# Patient Record
Sex: FEMALE
[De-identification: names, ages, dates, MRNs, and addresses within clinical notes are randomized per-mention and may not be internally consistent; named-entity substitution may affect disease eponyms.]

---

## 2020-04-20 PROBLEM — Z00.00 ENCOUNTER FOR PREVENTIVE HEALTH EXAMINATION: Status: ACTIVE | Noted: 2020-04-20

## 2020-04-21 ENCOUNTER — APPOINTMENT (OUTPATIENT)
Dept: OTOLARYNGOLOGY | Facility: CLINIC | Age: 25
End: 2020-04-21
Payer: COMMERCIAL

## 2020-04-21 PROCEDURE — 99202 OFFICE O/P NEW SF 15 MIN: CPT | Mod: 95

## 2020-04-21 RX ORDER — NEOMYCIN SULFATE, POLYMYXIN B SULFATE, HYDROCORTISONE 3.5; 10000; 1 MG/ML; [USP'U]/ML; MG/ML
1 SOLUTION/ DROPS AURICULAR (OTIC)
Qty: 10 | Refills: 0 | Status: ACTIVE | COMMUNITY
Start: 2020-03-19

## 2020-04-21 RX ORDER — AMOXICILLIN 875 MG/1
875 TABLET, FILM COATED ORAL
Qty: 20 | Refills: 0 | Status: ACTIVE | COMMUNITY
Start: 2020-03-10

## 2020-04-21 RX ORDER — FLUTICASONE PROPIONATE 50 UG/1
50 SPRAY, METERED NASAL
Qty: 16 | Refills: 0 | Status: ACTIVE | COMMUNITY
Start: 2020-03-10

## 2020-04-21 RX ORDER — CLINDAMYCIN PHOSPHATE 1 G/10ML
1 GEL TOPICAL
Qty: 30 | Refills: 0 | Status: ACTIVE | COMMUNITY
Start: 2020-03-11

## 2020-04-21 RX ORDER — PREDNISONE 20 MG/1
20 TABLET ORAL
Qty: 5 | Refills: 0 | Status: ACTIVE | COMMUNITY
Start: 2020-03-10

## 2020-04-21 RX ORDER — AZITHROMYCIN 250 MG/1
250 TABLET, FILM COATED ORAL
Qty: 6 | Refills: 0 | Status: ACTIVE | COMMUNITY
Start: 2020-03-19

## 2020-04-21 NOTE — HISTORY OF PRESENT ILLNESS
[Home] : at home, [unfilled] , at the time of the visit. [Medical Office: ([facility])___] : at the medical office located in  [Patient] : the patient [Self] : self [de-identified] : 23 yo female \par 3/10/2020 developed nasal congestion and bilat ear pain-moderate\par ER eval-ear infection and rx'd amoxil and ear dops\par Pt still has pain and requires Alleve around the clock for relief\par Pt suspects that the drops may be helpful\par Developed left tinnitus that resolved\par Denies clenching teeth and q tips\par no other modifying factors\par no nasal or throat complaints at this time\par  [No] : patient does not have a  history of radiation therapy [Tinnitus] : tinnitus [Dizziness] : no dizziness [Hearing Loss] : no hearing loss [Headache] : no headache [Otalgia] : otalgia [Otorrhea] : no otorrhea [Vertigo] : no vertigo [Otitis Media with Effusion] : otitis media with effusion [Early Onset Hearing Loss] : no early onset hearing loss [Smoking] : no smoking [Loud Noise Exposure] : no history of loud noise exposure [Stroke] : no stroke [Facial Pain] : no facial pain [Facial Pressure] : no facial pressure [Nasal Congestion] : no nasal congestion [Ear Fullness] : no ear fullness [Allergic Rhinitis] : no allergic rhinitis [Environmental Allergies] : no environmental allergies [Seasonal Allergies] : no seasonal allergies [Environmental Allergens] : no environmental allergens [Adenoidectomy] : no adenoidectomy [Asthma] : no asthma [Allergies] : no allergies [Neck Mass] : no neck mass [Chills] : no chills [Neck Pain] : no neck pain [Cold Intolerance] : no cold intolerance [Cough] : no cough [Fatigue] : no fatigue [Heat Intolerance] : no heat intolerance [Hyperthyroidism] : no hyperthyroidism [Hodgkin Disease] : no hodgkin disease [Sialadenitis] : no sialadenitis [Tobacco Use] : no tobacco use [Non-Hodgkin Lymphoma] : no non-hodgkin lymphoma [Graves Disease] : no graves disease [Alcohol Use] : no alcohol use [Thyroid Cancer] : no thyroid cancer

## 2020-04-23 ENCOUNTER — APPOINTMENT (OUTPATIENT)
Dept: OTOLARYNGOLOGY | Facility: CLINIC | Age: 25
End: 2020-04-23
Payer: COMMERCIAL

## 2020-04-23 VITALS
SYSTOLIC BLOOD PRESSURE: 118 MMHG | HEART RATE: 92 BPM | DIASTOLIC BLOOD PRESSURE: 84 MMHG | WEIGHT: 241 LBS | BODY MASS INDEX: 42.7 KG/M2 | HEIGHT: 63 IN

## 2020-04-23 DIAGNOSIS — H92.03 OTALGIA, BILATERAL: ICD-10-CM

## 2020-04-23 DIAGNOSIS — M26.609 UNSPECIFIED TEMPOROMANDIBULAR JOINT DISORDER: ICD-10-CM

## 2020-04-23 DIAGNOSIS — R09.81 NASAL CONGESTION: ICD-10-CM

## 2020-04-23 DIAGNOSIS — Z80.9 FAMILY HISTORY OF MALIGNANT NEOPLASM, UNSPECIFIED: ICD-10-CM

## 2020-04-23 DIAGNOSIS — H93.12 TINNITUS, LEFT EAR: ICD-10-CM

## 2020-04-23 PROCEDURE — 99214 OFFICE O/P EST MOD 30 MIN: CPT

## 2020-04-23 NOTE — ASSESSMENT
[FreeTextEntry1] : TMJ:\par -soft food diet \par -dental evaluation \par -Tylenol for pain \par -warm and cold compresses \par -No gum chewing \par \par f/u prn

## 2020-04-23 NOTE — HISTORY OF PRESENT ILLNESS
[de-identified] : 23 yo female\par Patient present s/p telehealth visit. Patient complains of moderate bilateral ear pain Left more than right which started March 10, 2020. Was put on Amoxicillin and neomycin with no relief. Today she still continues to have ear pain accompanied with headaches. Has hx of migraine. Takes Aleve around the clock which helps with the earache. \par Denies seasonal allergies.\par Pt has no ear drainage, hearing loss, tinnitus, vertigo, nasal congestion, nasal discharge, epistaxis, sinus infections, facial pain, facial pressure, throat pain, dysphagia or fevers\par \par  [Tinnitus] : no tinnitus [Anxiety] : no anxiety [Dizziness] : no dizziness [Headache] : no headache [Hearing Loss] : no hearing loss [Recurrent Otitis Media] : no recurrent otitis media [Otitis Media with Effusion] : no otitis media with effusion [Presbycusis] : no presbycusis [Congenital Ear Malformation] : no congenital ear malformation [Meniere Disease] : no Meniere disease [Otosclerosis] : no otosclerosis [Perilymphatic Fistula] : no perilymphatic fistula [Hypertension] : no hypertension [Loud Noise Exposure] : no history of loud noise exposure [Smoking] : no smoking [Early Onset Hearing Loss] : no early onset hearing loss [Stroke] : no stroke [Facial Pain] : no facial pain [Facial Pressure] : no facial pressure [Nasal Congestion] : no nasal congestion [Diplopia] : no diplopia [Ear Fullness] : no ear fullness [Allergic Rhinitis] : no allergic rhinitis [Maxillary Tooth Infection] : no maxillary tooth infection [Septal Deviation] : no septal deviation [Environmental Allergies] : no environmental allergies [Seasonal Allergies] : no seasonal allergies [Environmental Allergens] : no environmental allergens [Adenoidectomy] : no adenoidectomy [Nasal FB Removal] : no nasal foreign body removal [Allergies] : no allergies [Asthma] : no asthma [Neck Mass] : no neck mass [Neck Pain] : no neck pain [Chills] : no chills [Cold Intolerance] : no cold intolerance [Fatigue] : no fatigue [Cough] : no cough [Hyperthyroidism] : no hyperthyroidism [Sialadenitis] : no sialadenitis [Heat Intolerance] : no heat intolerance [Non-Hodgkin Lymphoma] : no non-hodgkin lymphoma [Hodgkin Disease] : no hodgkin disease [Alcohol Use] : no alcohol use [Tobacco Use] : no tobacco use [Thyroid Cancer] : no thyroid cancer [Graves Disease] : no graves disease

## 2020-04-23 NOTE — PHYSICAL EXAM
[Midline] : trachea located in midline position [Normal] : no rashes [de-identified] : Bilateral Crepitus

## 2022-07-29 DIAGNOSIS — M19.072 PRIMARY OSTEOARTHRITIS, LEFT ANKLE AND FOOT: ICD-10-CM

## 2022-07-29 DIAGNOSIS — Z82.49 FAMILY HISTORY OF ISCHEMIC HEART DISEASE AND OTHER DISEASES OF THE CIRCULATORY SYSTEM: ICD-10-CM

## 2022-07-29 DIAGNOSIS — K31.9 DISEASE OF STOMACH AND DUODENUM, UNSPECIFIED: ICD-10-CM

## 2022-07-29 RX ORDER — ONDANSETRON 4 MG/1
4 TABLET, ORALLY DISINTEGRATING ORAL
Refills: 0 | Status: ACTIVE | COMMUNITY

## 2022-07-29 RX ORDER — OXYCODONE AND ACETAMINOPHEN 5; 325 MG/1; MG/1
5-325 TABLET ORAL
Refills: 0 | Status: ACTIVE | COMMUNITY

## 2022-07-29 RX ORDER — SPIRONOLACTONE 50 MG/1
50 TABLET ORAL
Refills: 0 | Status: ACTIVE | COMMUNITY

## 2022-07-29 RX ORDER — CETIRIZINE HYDROCHLORIDE 10 MG/1
10 TABLET, COATED ORAL
Refills: 0 | Status: ACTIVE | COMMUNITY

## 2022-08-08 ENCOUNTER — APPOINTMENT (OUTPATIENT)
Dept: PODIATRY | Facility: CLINIC | Age: 27
End: 2022-08-08

## 2022-08-08 VITALS — HEIGHT: 63 IN | WEIGHT: 240 LBS | BODY MASS INDEX: 42.52 KG/M2

## 2022-08-08 DIAGNOSIS — M72.2 PLANTAR FASCIAL FIBROMATOSIS: ICD-10-CM

## 2022-08-08 DIAGNOSIS — M77.31 CALCANEAL SPUR, RIGHT FOOT: ICD-10-CM

## 2022-08-08 DIAGNOSIS — M77.9 ENTHESOPATHY, UNSPECIFIED: ICD-10-CM

## 2022-08-08 PROCEDURE — 99212 OFFICE O/P EST SF 10 MIN: CPT | Mod: 25

## 2022-08-08 PROCEDURE — 20550 NJX 1 TENDON SHEATH/LIGAMENT: CPT | Mod: RT

## 2022-08-08 PROCEDURE — 20605 DRAIN/INJ JOINT/BURSA W/O US: CPT | Mod: 59,LT

## 2022-08-08 RX ORDER — DICLOFENAC SODIUM 1% 10 MG/G
1 GEL TOPICAL DAILY
Qty: 1 | Refills: 0 | Status: ACTIVE | COMMUNITY
Start: 2022-08-08 | End: 1900-01-01

## 2022-08-15 NOTE — ASSESSMENT
[FreeTextEntry1] : \par Impression: Extensor tendonitis left 2nd met. cuneiform joint at the instep of the foot. Right side plantar fasciitis and heel spur syndrome.\par \par Treatment: The patient consented. I injected each spot under sterile technique after prepping with alcohol and using Ethyl Chloride. At the right heel I used 1 1/2cc's of Marcaine and Kenalog-40. On the left side I used  1 1/2cc's of a combination of Marcaine and Dexamethasone Phosphate. I put a strapping on the left foot that could stay on for 24 hours. I gave the patient a series of stretching exercises. I discussed more appropriate shoe gear. I recommended Hoka's or something with better support. With continued pain, I discussed physical therapy. She could ice the area for the next week. Follow-up in the office for evaluation as needed.

## 2022-08-15 NOTE — PHYSICAL EXAM
[Sensation] : the sensory exam was normal to light touch and pinprick [No Focal Deficits] : no focal deficits [Deep Tendon Reflexes (DTR)] : deep tendon reflexes were 2+ and symmetric [Motor Exam] : the motor exam was normal [FreeTextEntry3] : Normal vascular exam. [de-identified] : Patient has equinus, slight bilateral. On the right side she has medial band insertional plantar fasciitis that is quite sensitive. On the left side she has an extensor tendonitis about the level of the 2nd met. cuneiform joint. There is no pain with resisted dorsiflexion but it is painful with palpation. [FreeTextEntry1] : Slight swelling at the left instep.

## 2022-08-15 NOTE — HISTORY OF PRESENT ILLNESS
[Flip Flops] : flip flops [FreeTextEntry1] : Patient presents today for evaluation of 2 problems. She has right plantar fasciitis and left instep pain and tendonitis. She had these problems in the past. She stopped stretching and wore some inappropriate shoes and it is really flared up. The heel spur is about a 7/10. The tendonitis is more of a 4/10.Injections have helped. She has taken anti-inflammatories recently which have not helped. She presents today for treatment and was requesting injection.\par \par